# Patient Record
Sex: MALE | Race: OTHER
[De-identification: names, ages, dates, MRNs, and addresses within clinical notes are randomized per-mention and may not be internally consistent; named-entity substitution may affect disease eponyms.]

---

## 2018-08-27 ENCOUNTER — HOSPITAL ENCOUNTER (EMERGENCY)
Dept: HOSPITAL 25 - UCEAST | Age: 33
Discharge: HOME | End: 2018-08-27
Payer: COMMERCIAL

## 2018-08-27 VITALS — SYSTOLIC BLOOD PRESSURE: 134 MMHG | DIASTOLIC BLOOD PRESSURE: 74 MMHG

## 2018-08-27 DIAGNOSIS — J45.901: Primary | ICD-10-CM

## 2018-08-27 PROCEDURE — 99212 OFFICE O/P EST SF 10 MIN: CPT

## 2018-08-27 PROCEDURE — 71046 X-RAY EXAM CHEST 2 VIEWS: CPT

## 2018-08-27 PROCEDURE — G0463 HOSPITAL OUTPT CLINIC VISIT: HCPCS

## 2018-08-27 NOTE — UC
Shortness of Breath HPI





- HPI Summary


HPI Summary: 


Patient is a 34 y/o M w/ c/o asthma Sx onsetting four days ago. He reports head 

congestion, SOB, upper back pain, and some cough. Abdominal pain is denied. 

Present Sx are reported to be similar to prior asthma exacerbations. On nurse's 

note, pain is rated 2/10. Patient states he experiences upper back discomfort 

with deep breaths and notes SOB worsens at night. He states he has albuterol, 

has been using inhaler, and started 10 mg Loratadine last night. Nothing is 

reported to alleviate Sx. Home medications and allergies reviewed.








- History of Current Complaint


Chief Complaint: UCAsthma


Stated Complaint: SOB ASTHMA


Hx Obtained From: Patient


Onset/Duration: Lasting Days - onset 4 days ago, Still Present


Timing: Constant


Current Severity: Mild - 2/10


Aggrevating Factors: Deep Breaths - aggravates back discomfort, Other - SOB 

worsens at night


Alleviating Factors: Nothing


Associated Signs & Symptoms: Positive: Other - POSITIVE: cough, upper back pain

, head congestion NEGATIVE: abdominal pain





- Allergy/Home Medications


Allergies/Adverse Reactions: 


 Allergies











Allergy/AdvReac Type Severity Reaction Status Date / Time


 


No Known Allergies Allergy   Verified 08/27/18 07:48











Home Medications: 


 Home Medications





Albuterol HFA INHALER* [Ventolin HFA Inhaler*] 2 puff INH Q4H PRN 08/27/18 [

History Confirmed 08/27/18]


Guanfacine HCl [Intuniv] 3 mg PO DAILY WITH MEAL 08/27/18 [History Confirmed 08/ 27/18]


Loratadine 10 mg PO DAILY WITH MEAL 08/27/18 [History Confirmed 08/27/18]


Ramipril [Altace] 10 mg PO DAILY WITH MEAL 08/27/18 [History Confirmed 08/27/18]











PMH/Surg Hx/FS Hx/Imm Hx


Cardiovascular History: Hypertension


Respiratory History: Asthma


Other History Of: 


   Negative For: Anticoagulant Therapy





- Surgical History


Surgical History: None





- Family History


Known Family History: Positive: Hypertension


   Negative: Cardiac Disease, Diabetes, Respiratory Disease, Seizure Disorder, 

Blood Disorder





- Social History


Alcohol Use: None


Substance Use Type: None


Smoking Status (MU): Never Smoked Tobacco





Review of Systems


ENT: Other - head congestion


Respiratory: Shortness Of Breath, Cough


Gastrointestinal: Other - NEGATIVE: abdominal pain


Musculoskeletal: Other: - POSITIVE: upper back pain


All Other Systems Reviewed And Are Negative: Yes





Physical Exam





- Summary


Physical Exam Summary: 


Appearance: Well-appearing, Well-nourished


Skin: Warm


Eyes: Normal


ENT: Normal


Neck: Supple, nontender


Respiratory: minimal expiratory wheeze


Cardiovascular: Normal S1, S2. No murmurs. Normal distal pulses in tibial and 

radial bilaterally.


Abdomen: Soft, nontender


Musculoskeletal: Normal, Strength/ROM Intact


Neurological: Normal, A&Ox3


Psychiatric: Normal


General: No acute distress








Triage Information Reviewed: Yes


Vital Signs: 


 Initial Vital Signs











Temp  97.6 F   08/27/18 07:41


 


Pulse  80   08/27/18 07:41


 


Resp  20   08/27/18 07:41


 


BP  134/74   08/27/18 07:41


 


Pulse Ox  100   08/27/18 07:41











Vital Signs Reviewed: Yes





Diagnostics





- Radiology


  ** CXR


Xray Interpretation: No Acute Changes


Radiology Interpretation Completed By: ED Physician - No obvious 

consolidations. He does have bilateral hyperinflation of lungs, flattened 

diaphragms. No signs pneumothorax., Radiologist - Elevated lung volumes may 

reflect obstructive lung disease or simply exubarant insipratory effort for 

examination. No evidence for acute intrathoracic disease. This report was 

reviewed by ED physician.





Re-Evaluation





- Re-Evaluation


  ** First Eval


Re-Evaluation Time: 08:52


Change: Improved


Comment: Nebulizer treatment helped w/ Sx and patient reports feeling better. 

Discussed results of CXR as well as plan of discharge. Patient is agreeable 

with plan.





Shortness of Breath Dx





- Course


Course Of Treatment: Patient feels better after medications here in the 

emergency department, chest x-ray consistent with asthma with bilateral 

hypoinflation without evidence of consolidation or pneumothorax.  Patient 

instructed continue medications and to make an appointment with primary care 

provider for further care.  Agrees to understands discharge instructions.  

Vital signs stable.





- Differential Dx/Diagnosis


Provider Diagnoses: asthma exacerbation





Discharge





- Sign-Out/Discharge


Documenting (check all that apply): Patient Departure - discharge 


All imaging exams completed and their final reports reviewed: Yes





- Discharge Plan


Condition: Stable


Disposition: HOME


Prescriptions: 


Albuterol inh POWDER (NF) [Proair Respiclick] 2 puff INH Q4H PRN #2 mdi


 PRN Reason: Sob/Wheezing


predniSONE TAB* [Deltasone TAB*] 50 mg PO DAILY #4 tab


Patient Education Materials:  Asthma (ED)


Referrals: 


Melba VICKERS,Sheryl EDMONDSON [Primary Care Provider] - 


Additional Instructions: 


PLEASE USE MEDICATIONS AS DIRECTED


PLEASE RETURN TO THE EMERGENCY DEPARTMENT FOR ANY WORSENING OR CONCERNING 

SYMPTOMS


PLEASE MAKE AN APPOINTMENT TO SEE YOUR PRIMARY CARE DOCTOR WITHIN 1 WEEK.





- Billing Disposition and Condition


Condition: STABLE


Disposition: Home





- Attestation Statements


Document Initiated by Lucasibe: Yes


Documenting Scribe: Ubaldo Garcia


Provider For Whom Scribe is Documenting (Include Credential): Kannan Landers


Scribe Attestation: 


IUbaldo, scribed for Kannan Landers on 08/27/18 at 0929. 


Scribe Documentation Reviewed: Yes


Provider Attestation: 


The documentation as recorded by the Ubaldo swanson accurately reflects 

the service I personally performed and the decisions made by meKannan

## 2018-08-27 NOTE — RAD
INDICATION: Chest and back pain since Thursday night.



COMPARISON: No relevant prior exams available on the [facility] PACS for comparison.



TECHNIQUE:  Dual energy PA and routine lateral views of the chest were obtained.



REPORT: Elevated lung volumes. No focal pulmonary lesion, compelling alveolar

consolidation, pleural effusion, pneumothorax. The heart, pulmonary vasculature, and

mediastinal contours are unremarkable. Unremarkable soft tissue contours and osseous

structures. 



IMPRESSION: 

#. Elevated lung volumes may reflect obstructive lung disease or simply exuberant

inspiratory effort for examination. 

#. No evidence for acute intrathoracic disease.

## 2018-12-04 ENCOUNTER — HOSPITAL ENCOUNTER (EMERGENCY)
Dept: HOSPITAL 25 - UCEAST | Age: 33
Discharge: HOME | End: 2018-12-04
Payer: COMMERCIAL

## 2018-12-04 VITALS — DIASTOLIC BLOOD PRESSURE: 98 MMHG | SYSTOLIC BLOOD PRESSURE: 151 MMHG

## 2018-12-04 DIAGNOSIS — J32.9: Primary | ICD-10-CM

## 2018-12-04 DIAGNOSIS — R05: ICD-10-CM

## 2018-12-04 LAB
BASOPHILS # BLD AUTO: 0 10^3/UL (ref 0–0.2)
EOSINOPHIL # BLD AUTO: 0.2 10^3/UL (ref 0–0.6)
HCT VFR BLD AUTO: 46 % (ref 42–52)
HGB BLD-MCNC: 15.8 G/DL (ref 14–18)
LYMPHOCYTES # BLD AUTO: 1.9 10^3/UL (ref 1–4.8)
MCH RBC QN AUTO: 30 PG (ref 27–31)
MCHC RBC AUTO-ENTMCNC: 34 G/DL (ref 31–36)
MCV RBC AUTO: 86 FL (ref 80–94)
MONOCYTES # BLD AUTO: 1.5 10^3/UL (ref 0–0.8)
NEUTROPHILS # BLD AUTO: 10.9 10^3/UL (ref 1.5–7.7)
NRBC # BLD AUTO: 0 10^3/UL
NRBC BLD QL AUTO: 0
PLATELET # BLD AUTO: 241 10^3/UL (ref 150–450)
RBC # BLD AUTO: 5.32 10^6/UL (ref 4–5.4)
WBC # BLD AUTO: 14.5 10^3/UL (ref 3.5–10.8)

## 2018-12-04 PROCEDURE — 71046 X-RAY EXAM CHEST 2 VIEWS: CPT

## 2018-12-04 PROCEDURE — 36415 COLL VENOUS BLD VENIPUNCTURE: CPT

## 2018-12-04 PROCEDURE — G0463 HOSPITAL OUTPT CLINIC VISIT: HCPCS

## 2018-12-04 PROCEDURE — 85025 COMPLETE CBC W/AUTO DIFF WBC: CPT

## 2018-12-04 PROCEDURE — 99201: CPT

## 2018-12-04 NOTE — UC
Respiratory Complaint HPI





- HPI Summary


HPI Summary: 


PT HAS BEEN COUGHING FOR A FEW MONTHS. TRIED TAKING LORATADINE AND ALBUTEROL 

BUT SX HAVE BEEN PERSISTENT. NO FEVER OR N/V. WAS PRESCRIBED AUGMENTIN BY HIS 

PCP A COUPLE OF WEEKS AGO BUT JUST STARTED TAKING IT YESTERDAY. CAME IN TODAY 

BECAUSE HE DEVELOPED SORE NECK MUSCLES AND SINUS PRESSURE TODAY.  





- History of Current Complaint


Chief Complaint: UCGeneralIllness


Stated Complaint: SINUS PRESSURE


Time Seen by Provider: 12/04/18 15:02


Hx Obtained From: Patient


Onset/Duration: Gradual Onset, Lasting Weeks, Still Present


Timing: Constant


Severity Initially: Moderate


Severity Currently: Moderate


Pain Intensity: 8


Pain Scale Used: 0-10 Numeric


Character: Cough: Nonproductive


Aggravating Factors: Allergens


Alleviating Factors: Bronchodilator - ALBUTEROL, OTC Meds - TYLENOL


Associated Signs And Symptoms: Positive: URI, Nasal Congestion, Sinus Discomfort





- Allergies/Home Medications


Allergies/Adverse Reactions: 


 Allergies











Allergy/AdvReac Type Severity Reaction Status Date / Time


 


No Known Allergies Allergy   Verified 12/04/18 15:08














PMH/Surg Hx/FS Hx/Imm Hx


Cardiovascular History: Hypertension


Respiratory History: Asthma


Other History Of: 


   Negative For: Anticoagulant Therapy





- Surgical History


Surgical History: None





- Family History


Known Family History: Positive: Hypertension


   Negative: Cardiac Disease, Diabetes, Respiratory Disease, Seizure Disorder, 

Blood Disorder





- Social History


Alcohol Use: None


Substance Use Type: None


Smoking Status (MU): Never Smoked Tobacco





Review of Systems


All Other Systems Reviewed And Are Negative: Yes


Constitutional: Positive: Fatigue


ENT: Positive: Sinus Congestion, Sinus Pain/Tenderness


Respiratory: Positive: Cough


Cardiovascular: Positive: Negative


Gastrointestinal: Positive: Negative


Musculoskeletal: Positive: Myalgia


Neurological: Positive: Headache





Physical Exam


Triage Information Reviewed: Yes


Appearance: Well-Appearing, No Pain Distress, Well-Nourished


Vital Signs: 


 Initial Vital Signs











Temp  97.0 F   12/04/18 14:45


 


Pulse  103   12/04/18 14:45


 


Resp  18   12/04/18 14:45


 


BP  151/98   12/04/18 14:45


 


Pulse Ox  99   12/04/18 14:45











Vital Signs Reviewed: Yes


Eyes: Positive: Conjunctiva Clear


ENT: Positive: Hearing grossly normal, Pharynx normal, TMs normal, Sinus 

tenderness


Neck: Positive: Supple, Nontender, No Lymphadenopathy


Respiratory Exam: Normal


Cardiovascular Exam: Normal


Abdomen Description: Positive: Soft


Musculoskeletal: Positive: No Edema


Neurological: Positive: Alert, Other: - NEG KERNIG AND BRUDZIFAY


Psychological: Positive: Age Appropriate Behavior


Skin: Negative: Rashes





UC Diagnostic Evaluation





- Laboratory


O2 Sat by Pulse Oximetry: 99





Respiratory Course/Dx





- Course


Course Of Treatment: PATIENT'S SYMPTOMS ARE LIKELY MULTIFACTORIAL WITH A 

COMPONENT OF ALLERGIES ALONG WITH AN ACUTE URI AND SINUSITIS.  ADVISED PATIENT 

TO RESUME TAKING HIS LORATADINE DAILY.  ALBUTEROL AS NEEDED.  ALSO CONTINUE 

AUGMENTIN FOR SINUS INFECTION.  CLINICAL PRESENTATION NOT CONSISTENT WITH 

MENINGITIS.  PATIENT WILL TAKE IBUPROFEN FOR DISCOMFORT AND STAY WELL-HYDRATED.

  CHEST X-RAY TODAY IS UNREMARKABLE.  CBC PENDING. TO ED IF SX WORSEN.





- Differential Dx/Diagnosis


Provider Diagnosis: 


 Sinusitis








Discharge





- Sign-Out/Discharge


Documenting (check all that apply): Patient Departure


All imaging exams completed and their final reports reviewed: Yes





- Discharge Plan


Condition: Stable


Disposition: HOME


Patient Education Materials:  Sinusitis (ED)


Referrals: 


Mleba VICKERS,Sheryl EDMONDSON [Medical Doctor] - If Needed


Additional Instructions: 


CHEST XRAY TODAY UNREMARKABLE. CBC DRAWN. WE WILL CALL YOU WITH ANY ABNORMAL 

RESULTS. CONTINUE YOUR AUGMENTIN FOR THE FULL COURSE. RESUME YOUR LORATADINE 

DAILY. FOLLOW-UP WITH YOUR PCP IF YOU ARE NOT IMPROVING AS EXPECTED. GO TO THE 

ED IF YOU DEVELOP FEVER, WORSENING PAIN, NAUSEA OR ANY OTHER CONCERNING 

SYMPTOMS. 





IBUPROFEN MAX DOSE: 600MG (3 TABS) EVERY 6 HRS OR 800MG (4 TABS) EVERY 8 HRS


OR


NAPROXEN MAX DOSE: 440MG (2 TABS) EVERY 12 HRS





TYLENOL MAX DOSE: 1000MG (2 EXTRA STRENGTH TABS) EVERY 8 HRS OR 650MG (2 

REGULAR TABS) EVERY 6 HRS








- Billing Disposition and Condition


Condition: STABLE


Disposition: Home

## 2018-12-04 NOTE — XMS REPORT
Robe Griffin

 Created on:2018



Patient:Robe Ewing

Sex:Male

:1985

External Reference #:[id number]





Demographics







 Address  [address] [place] [address]

 

 Mobile Phone  [phone]

 

 Email Address  [email or web address]

 

 Preferred Language  English

 

 Marital Status   Or 

 

 Faith Affiliation  Unknown

 

 Race  Other Race

 

 Ethnic Group   Or 









Author







 Organization  [facility]

 

 Address  [address]



   [place] [address]

 

 Phone  [phone]









Support







 Name  Relationship  Address  Phone

 

 Kane Norris  Unavailable  Unavailable  [phone]









Care Team Providers







 Name  Role  Phone

 

 Ronal Laguerre MD  Primary Care Physician  Unavailable









Payers







 Type  Date  Identification Numbers  Payment Provider  Subscriber

 

 Commercial  Effective:  Policy Number:  [employer]  Robe Morgan



   2016  [id number]    [place]









 Expires: 2017  Group Number: [id number]  [id number]









 Group Name: [id number]  [place] [address]









 PayID: [id number]









 [facility]    Policy Number:  [facility]  Robe Morgan



     [id number]    [place]









 PayID: [id number]  [facility],[address]









 [place] [address]







Problems







 Description

 

 No Information







Social History







 Type  Date  Description  Comments

 

 Smoking    Patient has never smoked  







Allergies, Adverse Reactions, Alerts







 Date  Description  Reaction  Status  Severity  Comments

 

 2018  Shellfish-Derived Products    active  Mild to Moderate  







Medications







 Medication  Date  Status  Form  Strength  Qnty  SIG  Indications  Ordering



                 Provider

 

 Fluticasone  /  Active  Suspension  50mcg/Act  16unit  2 sprays  R09.81  
Mata



 Propionate  2018        s  each    BRENNEN Carter



             nostril qd.    

 

                 

 

 Augmentin  /  Hx  Tablets  875-125mg  20tabs  1 tablet by  R09.81  Mata



   2018 -          mouth q12    BRENNEN Carter



   /          hours for    



   2018          10 days    

 

 Ramipril  00/00/  Active  Capsules  10mg    1 by mouth    Unknown



   0000          every day    

 

 Guanfacine  00/00/  Active  Tablets  1mg    3 tab in    Unknown



 HCL  0000          mid    



             afternoon    

 

 Vitamin K2  /00/  Active  Capsules  100mcg    1 by mouth    Unknown



   0000          every day    

 

 Magnesium  00/  Active  Capsules  300mg    1 by mouth    Unknown



   0000          twice a day    

 

 Chamomile  /  Active    1200mg    qg    Unknown



 Extract  0000              

 

 Vitamin D  /  Active  Capsules  2000Unit    1 by mouth    Unknown



 (Ergocalcifer  0000          every day    



 ol)                

 

 Ventolin HFA  /  Active  Aerosol  108(90Base    2 puffs by    Unknown



   0000      ) mcg/Act    mouth four    



             times a day    



             as needed    







Vital Signs







 Date  Vital  Result  Comment

 

 2018  Height  67 inches  5'7"









 Weight  133.00 lb  with shoes

 

 Heart Rate  99 /min  

 

 BP Systolic  110 mmHg  

 

 BP Diastolic  64 mmHg  

 

 Body Temperature  98.0 F  

 

 O2 % BldC Oximetry  99 %  

 

 BMI (Body Mass Index)  20.8 kg/m2  







Results







 Description

 

 No Information







Procedures







 Date  CPT Code  Description  Status

 

 2017  38631  ECHO Stress Test Incl Perf Contiuous ekg Monitoring  
Completed



     W/Phys Superv  

 

 02/15/2017  39640  ECHO Transthoracic, Real-Time 2D With Doppler And Color  
Completed



     Flow  







Plan of Care

Future Appointment(s):2019  4:20 pm - Ronal Laguerre M.D.,FACP at [facility] 
Internal Medicine - [address] - Mata Carter NPI10 Essential (primary) 
hypertensionComments:~B_HYPERTENSION:~b_Well controlled on current regimen. 
Continue present management.Follow up:BOBY: Avinash. 6 aphpzyP12.20 Mild 
intermittent asthma, uncomplicatedComments:If you start needing your rescue 
inhaler more than 4 times weekly please let me know.R09.81 Nasal congestionNew 
Medication:Fluticasone Propionate 50 mcg/ActAugmentin 875-125 mgComments:I 
recommend trying fluticasone nasal spray. Use two sprays each nostril once 
daily. Try this for at least two weeks, as it takes at least 7-10 days to start 
working. If you develop symptoms like when you have had sinusitis in the past 
while you are away, you can start taking the antibiotic.

## 2018-12-05 NOTE — UC
- Progress Note


Progress Note: 





12/5/2018


WBC=14.5 elevated w/ ABS neutrophils=10.9 elevated 


Pt Dx w/ sinusitis and Rx Augmentin PO 


Please call back patient and inform him of results. 


Strongly advised labs shows a bacterial infection the antibiotic you were Rx 

will cover for it. But  if symptoms are not improving Pt should go to the ER 

the ER further treatment.


Thank you


Rylie Pereyra PA-C 





Course/Dx





- Diagnoses


Provider Diagnoses: 


 Sinusitis








Discharge





- Sign-Out/Discharge


Documenting (check all that apply): Patient Departure - d/c home


All imaging exams completed and their final reports reviewed: Yes





- Discharge Plan


Condition: Stable


Disposition: HOME


Patient Education Materials:  Sinusitis (ED)


Referrals: 


Melba VICKERS,Sheryl EDMONDSON [Medical Doctor] - If Needed


Additional Instructions: 


CHEST XRAY TODAY UNREMARKABLE. CBC DRAWN. WE WILL CALL YOU WITH ANY ABNORMAL 

RESULTS. CONTINUE YOUR AUGMENTIN FOR THE FULL COURSE. RESUME YOUR LORATADINE 

DAILY. FOLLOW-UP WITH YOUR PCP IF YOU ARE NOT IMPROVING AS EXPECTED. GO TO THE 

ED IF YOU DEVELOP FEVER, WORSENING PAIN, NAUSEA OR ANY OTHER CONCERNING 

SYMPTOMS. 





IBUPROFEN MAX DOSE: 600MG (3 TABS) EVERY 6 HRS OR 800MG (4 TABS) EVERY 8 HRS


OR


NAPROXEN MAX DOSE: 440MG (2 TABS) EVERY 12 HRS





TYLENOL MAX DOSE: 1000MG (2 EXTRA STRENGTH TABS) EVERY 8 HRS OR 650MG (2 

REGULAR TABS) EVERY 6 HRS








- Billing Disposition and Condition


Condition: STABLE


Disposition: Home

## 2019-01-02 ENCOUNTER — HOSPITAL ENCOUNTER (EMERGENCY)
Dept: HOSPITAL 25 - ED | Age: 34
Discharge: HOME | End: 2019-01-02
Payer: COMMERCIAL

## 2019-01-02 VITALS — SYSTOLIC BLOOD PRESSURE: 142 MMHG | DIASTOLIC BLOOD PRESSURE: 83 MMHG

## 2019-01-02 DIAGNOSIS — H57.12: ICD-10-CM

## 2019-01-02 DIAGNOSIS — Z91.013: ICD-10-CM

## 2019-01-02 DIAGNOSIS — I10: ICD-10-CM

## 2019-01-02 DIAGNOSIS — R20.2: Primary | ICD-10-CM

## 2019-01-02 DIAGNOSIS — F90.9: ICD-10-CM

## 2019-01-02 DIAGNOSIS — Z82.49: ICD-10-CM

## 2019-01-02 LAB
ALBUMIN SERPL BCG-MCNC: 4.6 G/DL (ref 3.2–5.2)
ALBUMIN/GLOB SERPL: 1.5 {RATIO} (ref 1–3)
ALP SERPL-CCNC: 73 U/L (ref 34–104)
ALT SERPL W P-5'-P-CCNC: 11 U/L (ref 7–52)
ANION GAP SERPL CALC-SCNC: 6 MMOL/L (ref 2–11)
APTT PPP: 28.4 SECONDS (ref 26–36.3)
AST SERPL-CCNC: 17 U/L (ref 13–39)
BASOPHILS # BLD AUTO: 0.1 10^3/UL (ref 0–0.2)
BUN SERPL-MCNC: 12 MG/DL (ref 6–24)
BUN/CREAT SERPL: 12.2 (ref 8–20)
CALCIUM SERPL-MCNC: 9.9 MG/DL (ref 8.6–10.3)
CHLORIDE SERPL-SCNC: 100 MMOL/L (ref 101–111)
EOSINOPHIL # BLD AUTO: 1 10^3/UL (ref 0–0.6)
GLOBULIN SER CALC-MCNC: 3.1 G/DL (ref 2–4)
GLUCOSE SERPL-MCNC: 100 MG/DL (ref 70–100)
HCO3 SERPL-SCNC: 29 MMOL/L (ref 22–32)
HCT VFR BLD AUTO: 50 % (ref 42–52)
HGB BLD-MCNC: 17.3 G/DL (ref 14–18)
INR PPP/BLD: 0.99 (ref 0.77–1.02)
LYMPHOCYTES # BLD AUTO: 2.7 10^3/UL (ref 1–4.8)
MCH RBC QN AUTO: 30 PG (ref 27–31)
MCHC RBC AUTO-ENTMCNC: 35 G/DL (ref 31–36)
MCV RBC AUTO: 85 FL (ref 80–94)
MONOCYTES # BLD AUTO: 1 10^3/UL (ref 0–0.8)
NEUTROPHILS # BLD AUTO: 4.9 10^3/UL (ref 1.5–7.7)
NRBC # BLD AUTO: 0 10^3/UL
NRBC BLD QL AUTO: 0
PLATELET # BLD AUTO: 203 10^3/UL (ref 150–450)
POTASSIUM SERPL-SCNC: 3.9 MMOL/L (ref 3.5–5)
PROT SERPL-MCNC: 7.7 G/DL (ref 6.4–8.9)
RBC # BLD AUTO: 5.86 10^6/UL (ref 4–5.4)
SODIUM SERPL-SCNC: 135 MMOL/L (ref 135–145)
WBC # BLD AUTO: 9.6 10^3/UL (ref 3.5–10.8)

## 2019-01-02 PROCEDURE — 70551 MRI BRAIN STEM W/O DYE: CPT

## 2019-01-02 PROCEDURE — 85730 THROMBOPLASTIN TIME PARTIAL: CPT

## 2019-01-02 PROCEDURE — 85610 PROTHROMBIN TIME: CPT

## 2019-01-02 PROCEDURE — 85025 COMPLETE CBC W/AUTO DIFF WBC: CPT

## 2019-01-02 PROCEDURE — 99283 EMERGENCY DEPT VISIT LOW MDM: CPT

## 2019-01-02 PROCEDURE — 93005 ELECTROCARDIOGRAM TRACING: CPT

## 2019-01-02 PROCEDURE — 81003 URINALYSIS AUTO W/O SCOPE: CPT

## 2019-01-02 PROCEDURE — 83605 ASSAY OF LACTIC ACID: CPT

## 2019-01-02 PROCEDURE — 86618 LYME DISEASE ANTIBODY: CPT

## 2019-01-02 PROCEDURE — 84484 ASSAY OF TROPONIN QUANT: CPT

## 2019-01-02 PROCEDURE — 70450 CT HEAD/BRAIN W/O DYE: CPT

## 2019-01-02 PROCEDURE — 86617 LYME DISEASE ANTIBODY: CPT

## 2019-01-02 PROCEDURE — 36415 COLL VENOUS BLD VENIPUNCTURE: CPT

## 2019-01-02 PROCEDURE — 80307 DRUG TEST PRSMV CHEM ANLYZR: CPT

## 2019-01-02 PROCEDURE — 80053 COMPREHEN METABOLIC PANEL: CPT

## 2019-01-02 NOTE — CONS
NEUROLOGY CONSULTATION:

 

DATE OF CONSULT:  01/02/19

 

LOCATION:  He is in the emergency room.

 

REFERRING PHYSICIAN:  Dr. Sevilla.

 

CHIEF COMPLAINT:  Numbness, eye pain.

 

HISTORY OF PRESENT ILLNESS:  Robe Griffin is a 33-year-old right-handed 
man, who noted about a week or 

8 days ago some numbness in his left foot and also some discomfort in his left 
hamstring region.  The discomfort in the hamstring was particularly noticeable 
if he palpated deeply in the hamstring.  He started to get some numbness of the 
right foot somewhere along the line, it is hard to sort out when.  Today, he 
woke up and he felt pain behind his left eye.  He noted it was worsened with 
eye movement.  His vision was a little bit blurry first thing in the morning, 
but he does not seem to note any visual problems currently.  The eye pain is 
considerably less, but it is still present if he moves his eye and focuses on 
it.  It is not very painful in terms of intensity at this point.  He has also 
noted perhaps a little numbness of the left hand, but it is hard to pin down 
out on that part.  He became concerned that he might have multiple sclerosis or 
a stroke.  His mother reportedly had a stroke in her early 30s.  He presented 
to the emergency room for evaluation.

 

PAST MEDICAL HISTORY:  Notable for hypertension, attention deficit disorder.

 

MEDICATIONS:  At home, consist of:

1.  Guanfacine 3 mg p.o. q. day.

2.  Ramipril 10 mg p.o. q. day.

 

ALLERGIES:  He is allergic to SHELLFISH.

 

SOCIAL HISTORY:  He is a nonsmoker.  He works at [employer].

 

REVIEW OF SYSTEMS:  Notable for episodic migraines.  He says he does not get 
them very often.  He has a history of hypertension.  There is no history of 
heart disease or diabetes.

 

PHYSICAL EXAM:  He is a well nourished and well hydrated 33-year-old.  
Temperature 98.2 orally, blood pressure running 120 to 140 systolic and into 
70s diastolic. Heart rate fluctuates, but during exam is about 70.  Respiratory 
rate is 12 and oxygen saturation is 99% on room air.

 

Heart is in a regular rhythm without murmurs.  Lungs are clear bilaterally.  
There are no cervical bruits.

 

Neurological Exam:  Pupils are equal at about 4 mm, reacting consensually to 
light to 2 mm.  There is mild ptosis in the left eye.  Eye movements are full 
and there is no nystagmus.  Visual fields are full to confrontation.  There is 
some mild discomfort with movement of the left eye.  Facial musculature is 
symmetric other than the ptosis.  Facial sensation to temperature and light 
touch is symmetric. Palate and tongue appear normal; palate rises symmetrically 
and tongue protrudes in the midline.  There is no dysarthria.

 

Sensory exam reveals intact vibration, proprioception, pin discrimination, and 
light touch in all 4 extremities.

 

There is no rest tremor.  Finger-to-nose maneuver is normal bilaterally.  Heel-
to- shin maneuver is normal bilaterally.

 

Motor exam reveals normal muscle tone, strength, and bulk proximally and 
distally in all limbs.

 

Reflexes are intact and symmetric in the upper and lower extremities.  Plantar 
responses are flexor bilaterally.

 

Romberg sign is absent.  He can stand unaided.

 

He is alert and oriented and a good detailed historian.  Memory is intact and 
language is fluent.  He has adequate attention, concentration, and fund of 
knowledge.

 

DIAGNOSTIC STUDIES/LAB DATA:  Includes a CT of the brain interpreted as normal.
  I reviewed the images and I agree.

 

CBC is unremarkable as is chemistry profile.  INR normal at 0.99.  Urine tox 
screen is negative.

 

IMPRESSION AND PLAN:  Impression is that of some fairly minor and vague sensory 
symptoms.  However, with the addition of the more recent eye pain, multiple 
sclerosis does come into the differential.  I think reasonable to obtain an MRI 
scan of the brain.  I will do it noncontrasted, but if there are lesions, then 
I will plan to see him in followup in my office and get a brain MRI with 
contrast and cervical spine MRIs.  If the MRI is negative which is my highest 
suspicion, then I think he can be discharged home with reassurance.

 

 [id number]/CPS #: [id number]

[facility]ingham[facility] State HospitalSTERLING

## 2019-01-02 NOTE — ED
Neurological HPI





- HPI Summary


HPI Summary: 





A 34 y/o male presents to [facility] with a chief complaint of intermittent numbness 

in his left leg since 12/25/18. He also c/o tingling. Per triage he rates his 

pain as 4/10. He reports numbness and pain in his left eye and the left side of 

his face the night of 01/01/19. He claims that walking worsens his pain. He 

also claims that his right leg is feels tingling but does not feel numbness. He 

claims his left arm feels like muscles are sore like after being in the gym 

for too long. He denies weakness. He has a Hx of HTN and has been medicating 

since 2012. He claims that his mother had a TIA at 32 and is now fully 

recovered. He also has a Hx of migraines but notes that eye pain is not related 

to his migraines. His last migraine was 12/04/18. He denies a SHx. NKDA. He 

denies smoking, EtOH use or drug use. The patient takes Ramipril 10mg once per 

day and takes Intuniv for ADHD. His next primary care physician appointment is 

scheduled with Dr. Laguerre at the end of February 2019. 





Vital Signs in room HR 82 bpm, /76.





- History of Current Complaint


Chief Complaint: EDNeurologicalDeficit


Stated Complaint: NUMBNESS ON LEFT SIDE, PAIN IN LEFT EYE


Time Seen by Provider: 01/02/19 12:01


Hx Obtained From: Patient


Onset/Duration: Sudden Onset, Started days ago, Still Present


Onset Severity: Moderate


Current Severity: Moderate


Neurological Deficit Location: Generalized - left face, left leg


Pain Intensity: 4


Pain Scale Used: 0-10 Numeric


Character: Numbness/Tingling


Aggravating: Exertion - Walking


Alleviating: Nothing


Associated Signs and Symptoms: Negative: Weakness





- Allergy/Home Medications


Allergies/Adverse Reactions: 


 Allergies











Allergy/AdvReac Type Severity Reaction Status Date / Time


 


shellfish derived Allergy  Anaphylatic Verified 01/02/19 11:55





   Shock  











Home Medications: 


 Home Medications





Guanfacine HCl [Guanfacine HCl ER] 3 mg PO DAILY 01/02/19 [History Confirmed 01/ 02/19]


Ramipril CAP* [Altace CAP*] 10 mg PO DAILY 01/02/19 [History Confirmed 01/02/19]











PMH/Surg Hx/FS Hx/Imm Hx


Endocrine/Hematology History: 


   Denies: Hx Anticoagulant Therapy, Hx Blood Disorders, Hx Bone Marrow Disease

, Hx Diabetes, Hx Thyroid Disease


Cardiovascular History: Reports: Hx Hypertension


   Denies: Hx Angioplasty


   Comment Only: Other Cardiovascular Problems/Disorders - HIGH BLOOD PRESSURE


Respiratory History: Reports: Hx Asthma


   Denies: Hx Chronic Obstructive Pulmonary Disease (COPD)


GI History: 


   Denies: Hx Obstructive Bowel, Hx Ulcer


Psychiatric History: Reports: Hx Attention Deficit Hyperactivity Disorder





- Surgical History


Surgery Procedure, Year, and Place: No SHx


Infectious Disease History: No


Infectious Disease History: 


   Denies: Hx Hepatitis, Hx Human Immunodeficiency Virus (HIV), Traveled 

Outside the [place] in Last 30 Days





- Family History


Known Family History: Positive: Hypertension, Other - Mother had TIA at 32 

years old and is fully recovered now


   Negative: Cardiac Disease, Diabetes, Respiratory Disease, Seizure Disorder, 

Blood Disorder





- Social History


Occupation: Employed Full-time


Lives: Alone


Alcohol Use: None


Hx Substance Use: No


Substance Use Type: Reports: None


Hx Tobacco Use: No


Smoking Status (MU): Never Smoked Tobacco





Review of Systems


Negative: Fever


Positive: Other - positive: left arm "Feels like muscles are sore like after 

being in the gym for too long"


Positive: Paresthesia - left and right leg, Numbness - left leg, left eye


All Other Systems Reviewed And Are Negative: Yes





Physical Exam





- Summary


Physical Exam Summary: 





Appearance: Well-appearing, no pain distress, well-nourished


Skin: Warm, color reflects adequate perfusion, dry


Head: Normal Head/Face inspection, atraumatic


Eyes: Conjunctiva clear, EOMI, PERRL


ENT: TMs are clear bilaterally, pharynx clear, no sinus tenderness


Neck: Supple, no nodes, no JVD


Respiratory: Lungs clear, normal breath sounds, no respiratory distress


Cardio: RRR, No murmur, pulses normal, brisk capillary refill


Abdomen: Soft, nontender


Bowel sounds: Present 


Musculoskeletal: Strength Intact/ROM intact, no calf tenderness, no edema.


Psychological: Normal


Neuro: Alert, muscle tone normal, no focal deficit, see NIH, NIH 1 for 

decreased sensation left cheek.


Triage Information Reviewed: Yes


Vital Signs On Initial Exam: 


 Initial Vitals











Temp Pulse Resp BP Pulse Ox


 


 98.2 F   80   20   137/89   99 


 


 01/02/19 11:38  01/02/19 11:38  01/02/19 11:38  01/02/19 11:38  01/02/19 11:38











Vital Signs Reviewed: Yes





- Wellman Coma Scale


Best Eye Response: 4 - Spontaneous


Best Motor Response: 6 - Obeys Commands


Best Verbal Response: 5 - Oriented


Coma Scale Total: 15





Diagnostics





- Vital Signs


 Vital Signs











  Temp Pulse Resp BP Pulse Ox


 


 01/02/19 12:00   74  13   100


 


 01/02/19 11:53   97  10  130/83  97


 


 01/02/19 11:50   103  9   100


 


 01/02/19 11:38  98.2 F  80  20  137/89  99














- Laboratory


Result Diagrams: 


 01/02/19 12:52





 01/02/19 12:52


Lab Statement: Any lab studies that have been ordered have been reviewed, and 

results considered in the medical decision making process.





- CT


  ** Brain


CT Interpretation Completed By: Radiologist


Summary of CT Findings: NO ACUTE INTRACRANIAL PATHOLOGY. ED provider has 

reviewed this imaging report.





  ** Brain MRI


CT Interpretation Completed By: Radiologist


Summary of CT Findings: No intracranial mass or hemorrhage is noted. ED 

provider has reivewed this imaging report.





- EKG


  ** 12:47


Cardiac Rate: NL - 93 bpm


EKG Rhythm: Sinus Rhythm


ST Segment: Non-Specific


Ectopy: None


Summary of EKG Findings: NSR at 93 bpm with nl AVIVCT, nlQTc, nl axis and no 

acute changes. No prior EKG to compare.





NIH Scale





- NIH Scale


Level of Consciousness: Alert/Keenly Responsive


Ask Patient the Month and His/Her Age: Both Correct


Ask Pt to Open/Close Eyes and /Release Non-Paretic Hand: Both Correctly


Best Gaze (Only Horizontal Eye Movement): Normal


Visual Field Testing: No Visual Loss


Facial Paresis-Pt to Smile & Close Eyes or Grimace Symmetry: Normal/Symmetrical


Motor Function - Right Arm: No Drift-Holds 10 Seconds


Motor Function - Left Arm: No Drift-Holds 10 Seconds


Motor Function - Right Leg: No Drift-Holds 10 Seconds


Motor Function - Left Leg: No Drift-Holds 10 Seconds


Limb Ataxia-Must be out of Proportion to Weakness Present: Absent


Sensory (Use Pinprick to Test Arms/Legs/Trunk/Face): Pinprick Less on Affected


Best Language (Describe Picture, Name Items): No Aphasia


Dysarthria (Read Several Words): Normal


Extinction and Inattention: No Abnormality


Total Score: 1





Re-Evaluation





- Re-Evaluation


  ** First Eval


Re-Evaluation Time: 13:01


Change: Unchanged


Comment: BP is 135/81





  ** Second Eval


Re-Evaluation Time: 14:48


Change: Unchanged


Comment: no new neurologic symptoms.  Tingling left face and left leg.  Mild 

lower abdominal cramp





  ** Third Eval


Re-Evaluation Time: 18:30


Change: Unchanged


Comment: Discussed discharge.





Course/Dx





- Course


Course Of Treatment: A 34 y/o male presents to [facility] with a chief complaint of 

intermittent numbness in his left leg since 12/25/18. He reports a PMHx of HTN 

and migraines. He denies a SHx. The patient's mother had a TIA when she was 32 

years old. The patient had a GCS of 15 and an NIH of 1. EKG revealed NSR at 93 

bpm with nl AVIVCT, nlQTc, nl axis and no acute changes. No prior EKG to 

compare. Brain CT impression: NO ACUTE INTRACRANIAL PATHOLOGY. At 15:10 Dr. Wilkes will consult in person and ordered a brain MRI without contrast. Brain 

MRI impression: No intracranial mass or hemorrhage is noted. In the ED course 

the patient was given IV fluids. Dx: paresthesia. The patient will be 

discharged. He was given strict return precautions and was instructed to follow 

up with Dr. Laguerre in 2 days and Dr. Wilkes if he felt necessary. He is 

agreeable with this plan.





- Diagnoses


Provider Diagnoses: 


 Paresthesia








- Physician Notifications


Discussed Care Of Patient With: Gabriel Wilkes


Time Discussed With Above Provider: 15:03


Instructed by Provider To: Other - Bruce, N.P. for Dr. Wilkes took a message 

for Dr. Wilkes. At 15:10 Dr. Wilkes will consult in person. At 16:05 Dr. Wilkes has ordered MRI without contrast of brain to evaluate for possible MS 

and optic neuritis.





Discharge





- Sign-Out/Discharge


Documenting (check all that apply): Patient Departure - DC





- Discharge Plan


Condition: Stable


Disposition: HOME


Patient Education Materials:  Paresthesia (ED)


Forms:  *Work Release


Referrals: 


Gabriel Wilkes MD [Medical Doctor] - If Needed


Ronal Laguerre MD [Primary Care Provider] - 2 Days (2)


Additional Instructions: 


Lyme serology is pending at this time. 


Your CT and your MRI were normal.  The reports are copied in this report. 


You were seen by Dr. Wilkes in the ER, and he ordered the MRI.  With the 

normal MRI, there is no further treatment needed, but the neurology group will 

consult again if needed. 


Return to the ER if you have any new or worsening symptoms. 





- Attestation Statements


Document Initiated by Lucasibsusan: Yes


Documenting Scribe: Bassem Larios


Provider For Whom Channing is Documenting (Include Credential): Dr. Ashley Sevilla MD


Scribe Attestation: 


I, Bassem Larios, scribed for Dr. Ashley Sevilla MD on 01/02/19 at 2104. 


Status of Scribe Document: Ready

## 2019-01-05 LAB
B BURGDOR IGG PATRN SER IB-IMP: (no result) KDA
B BURGDOR IGM PATRN SER IB-IMP: (no result) KDA

## 2019-01-24 ENCOUNTER — HOSPITAL ENCOUNTER (EMERGENCY)
Dept: HOSPITAL 25 - UCEAST | Age: 34
Discharge: HOME | End: 2019-01-24
Payer: COMMERCIAL

## 2019-01-24 VITALS — DIASTOLIC BLOOD PRESSURE: 75 MMHG | SYSTOLIC BLOOD PRESSURE: 131 MMHG

## 2019-01-24 DIAGNOSIS — M79.10: ICD-10-CM

## 2019-01-24 DIAGNOSIS — M25.50: Primary | ICD-10-CM

## 2019-01-24 LAB
ALBUMIN SERPL BCG-MCNC: 4.9 G/DL (ref 3.2–5.2)
ALBUMIN/GLOB SERPL: 1.6 {RATIO} (ref 1–3)
ALP SERPL-CCNC: 71 U/L (ref 34–104)
ALT SERPL W P-5'-P-CCNC: 19 U/L (ref 7–52)
ANION GAP SERPL CALC-SCNC: 5 MMOL/L (ref 2–11)
AST SERPL-CCNC: 19 U/L (ref 13–39)
BASOPHILS # BLD AUTO: 0.1 10^3/UL (ref 0–0.2)
BUN SERPL-MCNC: 14 MG/DL (ref 6–24)
BUN/CREAT SERPL: 15.4 (ref 8–20)
CALCIUM SERPL-MCNC: 10.1 MG/DL (ref 8.6–10.3)
CHLORIDE SERPL-SCNC: 100 MMOL/L (ref 101–111)
EOSINOPHIL # BLD AUTO: 0.4 10^3/UL (ref 0–0.6)
GLOBULIN SER CALC-MCNC: 3 G/DL (ref 2–4)
GLUCOSE SERPL-MCNC: 97 MG/DL (ref 70–100)
HCO3 SERPL-SCNC: 31 MMOL/L (ref 22–32)
HCT VFR BLD AUTO: 48 % (ref 42–52)
HGB BLD-MCNC: 16.4 G/DL (ref 14–18)
LYMPHOCYTES # BLD AUTO: 2.9 10^3/UL (ref 1–4.8)
MCH RBC QN AUTO: 30 PG (ref 27–31)
MCHC RBC AUTO-ENTMCNC: 34 G/DL (ref 31–36)
MCV RBC AUTO: 87 FL (ref 80–94)
MONOCYTES # BLD AUTO: 0.6 10^3/UL (ref 0–0.8)
NEUTROPHILS # BLD AUTO: 5 10^3/UL (ref 1.5–7.7)
NRBC # BLD AUTO: 0 10^3/UL
NRBC BLD QL AUTO: 0
PLATELET # BLD AUTO: 262 10^3/UL (ref 150–450)
POTASSIUM SERPL-SCNC: 4.3 MMOL/L (ref 3.5–5)
PROT SERPL-MCNC: 7.9 G/DL (ref 6.4–8.9)
RBC # BLD AUTO: 5.57 10^6/UL (ref 4–5.4)
SODIUM SERPL-SCNC: 136 MMOL/L (ref 135–145)
TSH SERPL-ACNC: 1.42 MCIU/ML (ref 0.34–5.6)
WBC # BLD AUTO: 9 10^3/UL (ref 3.5–10.8)

## 2019-01-24 PROCEDURE — 85025 COMPLETE CBC W/AUTO DIFF WBC: CPT

## 2019-01-24 PROCEDURE — 84443 ASSAY THYROID STIM HORMONE: CPT

## 2019-01-24 PROCEDURE — G0463 HOSPITAL OUTPT CLINIC VISIT: HCPCS

## 2019-01-24 PROCEDURE — 86617 LYME DISEASE ANTIBODY: CPT

## 2019-01-24 PROCEDURE — 86703 HIV-1/HIV-2 1 RESULT ANTBDY: CPT

## 2019-01-24 PROCEDURE — 80053 COMPREHEN METABOLIC PANEL: CPT

## 2019-01-24 PROCEDURE — 86618 LYME DISEASE ANTIBODY: CPT

## 2019-01-24 PROCEDURE — 86140 C-REACTIVE PROTEIN: CPT

## 2019-01-24 PROCEDURE — 36415 COLL VENOUS BLD VENIPUNCTURE: CPT

## 2019-01-24 PROCEDURE — 99212 OFFICE O/P EST SF 10 MIN: CPT

## 2019-01-24 NOTE — UC
UC General HPI





- HPI Summary


HPI Summary: 


33-year-old male comes to clinic today with a chief complaint of joint and body 

aches.  Symptoms started a couple months ago.  Patient was seen on January 2, 2019 in the emergency department.  he was seen by neurology Dr. Mix.  He 

had a brain MRI which was normal.  He's continued to have symptoms.  On January 2 he had a Lyme test that was not conclusive.  Patient has his primary care 

physician appointment set up for late February 2019.  At this time no 

difficulty with speech or vision or weakness or numbness.





- History of Current Complaint


Chief Complaint: UCGeneralIllness


Stated Complaint: FOLLOW UP


Time Seen by Provider: 01/24/19 14:11


Pain Intensity: 4





- Allergy/Home Medications


Allergies/Adverse Reactions: 


 Allergies











Allergy/AdvReac Type Severity Reaction Status Date / Time


 


shellfish derived Allergy  Anaphylatic Verified 01/02/19 11:55





   Shock  














PMH/Surg Hx/FS Hx/Imm Hx


Previously Healthy: Yes


Other History Of: 


   Negative For: Anticoagulant Therapy





- Surgical History


Surgical History: None


Surgery Procedure, Year, and Place: No SHx





- Family History


Known Family History: Positive: Hypertension, Other - Mother had TIA at 32 

years old and is fully recovered now


   Negative: Cardiac Disease, Diabetes, Respiratory Disease, Seizure Disorder, 

Blood Disorder





- Social History


Alcohol Use: None


Substance Use Type: None


Smoking Status (MU): Never Smoked Tobacco





Review of Systems


All Other Systems Reviewed And Are Negative: Yes


Constitutional: Positive: Negative


Skin: Positive: Negative


Eyes: Positive: Negative


ENT: Positive: Negative


Respiratory: Positive: Negative


Cardiovascular: Positive: Negative


Gastrointestinal: Positive: Negative


Motor: Positive: Negative


Neurovascular: Positive: Negative


Musculoskeletal: Positive: Arthralgia, Myalgia


Neurological: Positive: Negative


Psychological: Positive: Negative


Is Patient Immunocompromised?: No





Physical Exam


Triage Information Reviewed: Yes


Appearance: Well-Appearing, No Pain Distress, Well-Nourished


Vital Signs: 


 Initial Vital Signs











Temp  98 F   01/24/19 12:57


 


Pulse  76   01/24/19 12:57


 


Resp  16   01/24/19 12:57


 


BP  131/75   01/24/19 12:57


 


Pulse Ox  100   01/24/19 12:57











Vital Signs Reviewed: Yes


Eye Exam: Normal


Eyes: Positive: Conjunctiva Clear


Neck exam: Normal


Neck: Positive: Supple


Respiratory: Positive: Lungs clear, Normal breath sounds, No respiratory 

distress


Cardiovascular: Positive: RRR


Musculoskeletal Exam: Normal


Musculoskeletal: Positive: Strength Intact, ROM Intact


Neurological Exam: Normal


Neurological: Positive: Alert, Muscle Tone Normal


Psychological Exam: Normal


Psychological: Positive: Age Appropriate Behavior


Skin Exam: Normal





Course/Dx





- Course


Course Of Treatment: The plan today is to check some lab work to include Lyme 

serology.  Also checking a CRP CBC CMP and TSH.  We will treat with 14 days of 

doxycycline for possible Lyme.  Patient be followed his primary care doctor and 

if symptoms continue with neurology Dr. Mix.  Reevaluate sooner if worse or 

any other questions or concerns.





- Diagnoses


Provider Diagnosis: 


 Arthralgia, Myalgia








Discharge





- Sign-Out/Discharge


Documenting (check all that apply): Patient Departure


All imaging exams completed and their final reports reviewed: No Studies





- Discharge Plan


Condition: Stable


Disposition: HOME


Prescriptions: 


DOXYcycline CAP(*) [DOXYcycline 100MG CAP(*)] 100 mg PO BID #28 cap


Patient Education Materials:  Arthralgia (ED), Musculoskeletal Pain (ED)


Referrals: 


Ronal Laguerre MD [Primary Care Provider] - 


Gabriel Mix MD [Medical Doctor] - 


Additional Instructions: 


FOLLOW UP WITH YOUR PRIMARY CARE DOCTOR.


FOLLOW UP WITH NEUROLOGY, DR MIX, IF NOT IMPROVED.


GET RECHECKED FOR ANY WORSENING OF YOUR CONDITION OR QUESTIONS OR CONCERNS.








- Billing Disposition and Condition


Condition: STABLE


Disposition: Home

## 2019-01-27 NOTE — ED
Progress





- Progress Note


Progress Note: 


Lyme testing  lab :


Lyme disease serology is positive,  Western blot test is pending at this time.


Patient is already on doxycycline.


No change, await final Western blot test results








Course/Dx





- Course


Course Of Treatment: The plan today is to check some lab work to include Lyme 

serology.  Also checking a CRP CBC CMP and TSH.  We will treat with 14 days of 

doxycycline for possible Lyme.  Patient be followed his primary care doctor and 

if symptoms continue with neurology Dr. Mix.  Reevaluate sooner if worse or 

any other questions or concerns.





- Diagnoses


Provider Diagnoses: 


 Arthralgia, Myalgia








Discharge





- Sign-Out/Discharge


Documenting (check all that apply): Post-Discharge Follow Up


All imaging exams completed and their final reports reviewed: No Studies





- Discharge Plan


Condition: Stable


Disposition: HOME


Prescriptions: 


DOXYcycline CAP(*) [DOXYcycline 100MG CAP(*)] 100 mg PO BID #28 cap


Patient Education Materials:  Musculoskeletal Pain (ED), Arthralgia (ED)


Referrals: 


Gabriel Mix MD [Medical Doctor] - 


Ronal Laguerre MD [Primary Care Provider] - 


Additional Instructions: 


FOLLOW UP WITH YOUR PRIMARY CARE DOCTOR.


FOLLOW UP WITH NEUROLOGY, DR MIX, IF NOT IMPROVED.


GET RECHECKED FOR ANY WORSENING OF YOUR CONDITION OR QUESTIONS OR CONCERNS.








- Billing Disposition and Condition


Condition: STABLE


Disposition: Home

## 2019-01-28 LAB — B BURGDOR IGM PATRN SER IB-IMP: (no result) KDA

## 2019-01-30 ENCOUNTER — HOSPITAL ENCOUNTER (EMERGENCY)
Dept: HOSPITAL 25 - UCEAST | Age: 34
Discharge: HOME | End: 2019-01-30
Payer: COMMERCIAL

## 2019-01-30 VITALS — DIASTOLIC BLOOD PRESSURE: 78 MMHG | SYSTOLIC BLOOD PRESSURE: 152 MMHG
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
## 2019-01-30 DIAGNOSIS — L27.1: Primary | ICD-10-CM

## 2019-01-30 DIAGNOSIS — Z91.013: ICD-10-CM

## 2019-01-30 DIAGNOSIS — T36.4X5A: ICD-10-CM

## 2019-01-30 PROCEDURE — G0463 HOSPITAL OUTPT CLINIC VISIT: HCPCS

## 2019-01-30 PROCEDURE — 99212 OFFICE O/P EST SF 10 MIN: CPT

## 2019-01-30 NOTE — UC
Allergic Reaction HPI





- HPI Summary


HPI Summary: 


PATIENT HAS TAKEN 5 DAYS OF DOXYCYCLINE FOR POSSIBLE LYME DISEASE.  YESTERDAY 

HE DEVELOPED MILD SWELLING, REDNESS AND ITCHING TO BOTH HIS EARS.  HIS LIPS 

FEEL TINGLY.  HE DENIES ANY DIFFICULTY BREATHING OR AIRWAY COMPROMISE.  HE HAS 

A BLOTCHY RASH ON HIS UPPER CHEST.  HAS NOT TAKEN ANY ANTIHISTAMINES SINCE 

ONSET OF SYMPTOMS.  HAS NOT TAKEN DOXYCYCLINE IN THE PAST. DENIES ANY OTHER NEW 

EXPOSURES.


 





- History of Current Complaint


Chief Complaint: UCAllergicReaction


Stated Complaint: ALLERGIC REACTION TO ANTI


Time Seen by Provider: 01/30/19 14:55


Hx Obtained From: Patient


Onset/Duration: Gradual Onset, Lasting Days - 1 DAY, Still Present


Severity Initially: Moderate


Severity Currently: Moderate


Pain Intensity: 0


Pain Scale Used: 0-10 Numeric


Character: Pruritus, Hives


Alleviating Factor(s): Nothing


Associated Signs And Symptoms: Positive: Rash.  Negative: Abdominal Pain, Chest 

Pain, Cough Wheezing, Diaphoresis, Difficulty Breathing, Hoarseness, Nausea, 

Syncope, Throat Tightening, Vomiting





- Related Hx


Possible Reaction To: Medications





- Allergies/Home Medications


Allergies/Adverse Reactions: 


 Allergies











Allergy/AdvReac Type Severity Reaction Status Date / Time


 


shellfish derived Allergy  Anaphylatic Verified 01/30/19 14:55





   Shock  














PMH/Surg Hx/FS Hx/Imm Hx


Cardiovascular History: Hypertension


Respiratory History: Asthma


Other History Of: 


   Negative For: Anticoagulant Therapy





- Surgical History


Surgical History: None


Surgery Procedure, Year, and Place: No SHx





- Family History


Known Family History: Positive: Hypertension, Other - Mother had TIA at 32 

years old and is fully recovered now


   Negative: Cardiac Disease, Diabetes, Respiratory Disease, Seizure Disorder, 

Blood Disorder





- Social History


Alcohol Use: None


Substance Use Type: None


Smoking Status (MU): Never Smoked Tobacco





Review of Systems


All Other Systems Reviewed And Are Negative: Yes


Constitutional: Positive: Negative


Skin: Positive: Rash


Respiratory: Positive: Negative


Cardiovascular: Positive: Negative


Gastrointestinal: Positive: Negative





Physical Exam


Triage Information Reviewed: Yes


Appearance: Well-Appearing, No Pain Distress, Well-Nourished


Vital Signs: 


 Initial Vital Signs











Temp  98 F   01/30/19 14:47


 


Pulse  93   01/30/19 14:47


 


Resp  16   01/30/19 14:47


 


BP  152/78   01/30/19 14:47


 


Pulse Ox  100   01/30/19 14:47











Vital Signs Reviewed: Yes


Eyes: Positive: Conjunctiva Clear


ENT: Positive: Hearing grossly normal, Pharynx normal, TMs normal, Other - NO 

TONGUE/LIP SWELLING


Neck: Positive: Supple, Nontender, No Lymphadenopathy


Respiratory Exam: Normal


Cardiovascular Exam: Normal


Abdomen Description: Positive: Soft


Musculoskeletal: Positive: No Edema


Neurological: Positive: Alert


Psychological: Positive: Age Appropriate Behavior


Skin: Positive: Rashes - BILATERAL EARS SLIGHTLY EDEMATOUS AND ERYTHEMATOUS. 

BLOTCHY RASH UPPER CHEST





Allergic Reaction Course/Dx





- Course


Course Of Treatment: DISCUSSED WITH PATIENT THAT HE IS LIKELY EXPERIENCING AN 

ALLERGIC REACTION TO THE DOXYCYCLINE.  SUGGESTED CHANGING TO ALTERNATIVE 

MEDICINE SUCH AS AMOXICILLIN VERSUS STOPPING THE MEDICATION ALTOGETHER GIVEN 

THAT HIS CONFIRMATORY TESTING FOR LYME DISEASE WAS IN FACT NEGATIVE.  PATIENT 

STATES THAT HE HAS BEEN STRUGGLING WITH THE SYMPTOMS FOR WEEKS AND THAT HE HAS 

TRIED AMOXICILLIN WITH NO EFFECT.  HE STATES HE ALREADY FEELS BETTER HAVING 

BEEN ON THE DOXYCYCLINE FOR SEVERAL DAYS AND WOULD LIKE TO CONTINUE THE COURSE 

TO COMPLETION IF HIS SYMPTOMS REMAIN MILD.  ADVISED THAT HE TAKE ANTIHISTAMINES 

DAILY AND WILL ALSO GIVE A SHORT COURSE OF PREDNISONE TO HELP COMBAT ANY 

ALLERGIC REACTION.  STRONGLY ADVISED HIM TO STOP THE MEDICATION AND GO TO THE 

ER IF HE DEVELOPS ANY WORSENING SYMPTOMS.





- Differential Dx/Diagnosis


Provider Diagnosis: 


 Allergic reaction caused by a drug








Discharge





- Sign-Out/Discharge


Documenting (check all that apply): Patient Departure


All imaging exams completed and their final reports reviewed: No Studies





- Discharge Plan


Condition: Stable


Disposition: HOME


Prescriptions: 


predniSONE TAB* [Deltasone 20 MG TAB*] 40 mg PO DAILY #10 tab


Patient Education Materials:  General Allergic Reaction (ED)


Referrals: 


Ronal Laguerre MD [Primary Care Provider] - 1 Week


Additional Instructions: 


YOU MAY BE EXPERIENCING A MILD ALLERGIC REACTION TO THE DOXYCYCLINE. WE 

DISCUSSED STOPPING THE DOXYCYCLINE AS YOUR CONFIRMATORY TESTING FOR LYME 

DISEASE WAS NEGATIVE, HOWEVER GIVEN THAT YOU'RE FEELING IMPROVED AND WOULD LIKE 

TO CONTINUE THE MEDICATION I WOULD RECOMMEND YOU TAKE 10 MG OF LORATADINE EVERY 

MORNING AND 50 MG OF BENADRYL EVERY NIGHT.  WILL ALSO PROVIDE A SHORT COURSE OF 

PREDNISONE TO HELP COMBAT ANY ALLERGIC REACTION.  SHOULD YOU DEVELOP WORSENING 

FACIAL SWELLING, TONGUE/LIP SWELLING, THROAT TIGHTNESS, WHEEZING, DIFFICULTY 

BREATHING, NAUSEA, FEVER OR ANY OTHER CONCERNING SYMPTOMS STOP THE MEDICINE AND 

GO TO DIRECTLY TO THE [facility] ER FOR FURTHER EVALUATION.





- Billing Disposition and Condition


Condition: STABLE


Disposition: Home